# Patient Record
Sex: MALE | Race: WHITE | NOT HISPANIC OR LATINO | Employment: FULL TIME | URBAN - METROPOLITAN AREA
[De-identification: names, ages, dates, MRNs, and addresses within clinical notes are randomized per-mention and may not be internally consistent; named-entity substitution may affect disease eponyms.]

---

## 2022-03-26 ENCOUNTER — OFFICE VISIT (OUTPATIENT)
Dept: URGENT CARE | Facility: CLINIC | Age: 32
End: 2022-03-26
Payer: MEDICAID

## 2022-03-26 VITALS
HEART RATE: 71 BPM | BODY MASS INDEX: 35.68 KG/M2 | TEMPERATURE: 98.1 F | HEIGHT: 70 IN | SYSTOLIC BLOOD PRESSURE: 116 MMHG | DIASTOLIC BLOOD PRESSURE: 76 MMHG | RESPIRATION RATE: 16 BRPM | WEIGHT: 249.2 LBS | OXYGEN SATURATION: 99 %

## 2022-03-26 DIAGNOSIS — R19.7 DIARRHEA, UNSPECIFIED TYPE: ICD-10-CM

## 2022-03-26 PROCEDURE — 99204 OFFICE O/P NEW MOD 45 MIN: CPT | Performed by: PHYSICIAN ASSISTANT

## 2022-03-26 RX ORDER — AZITHROMYCIN 500 MG/1
1000 TABLET, FILM COATED ORAL ONCE
Qty: 2 TABLET | Refills: 0 | Status: SHIPPED | OUTPATIENT
Start: 2022-03-26 | End: 2022-03-26

## 2022-03-26 ASSESSMENT — ENCOUNTER SYMPTOMS
SHORTNESS OF BREATH: 0
ABDOMINAL PAIN: 1
MYALGIAS: 0
COUGH: 0
CHILLS: 0
HEADACHES: 0
SORE THROAT: 0
FEVER: 0
NAUSEA: 0
DIARRHEA: 1
CONSTIPATION: 0
VOMITING: 0
EYE PAIN: 0

## 2022-03-26 NOTE — PROGRESS NOTES
"Subjective:   Fran Vivas is a 32 y.o. male who presents for Diarrhea (Abd pain x 1 week )      Is a 32-year-old male who has been suffering from 1 week of persistent diarrhea with intermittent abdominal cramping.  Patient reports that onset was 7 days ago without any prodrome, recent travel, recent antibiotics.  He has not had any camping or suspected food ingestion.  Patient reports typically greater than 10 loose bowel movements per day, no blood.  Tried Pepto-Bismol with minimal relief.  Has never had this problem before.  No family history of gastrointestinal: Problems.  He has not noticed any fevers or body aches.  He is not experiencing nausea, vomiting, heartburn.  He has tried various different foods none of which seems to exacerbate or alleviate his diarrhea      Review of Systems   Constitutional: Negative for chills and fever.   HENT: Negative for congestion, ear pain and sore throat.    Eyes: Negative for pain.   Respiratory: Negative for cough and shortness of breath.    Cardiovascular: Negative for chest pain.   Gastrointestinal: Positive for abdominal pain and diarrhea. Negative for constipation, nausea and vomiting.   Genitourinary: Negative for dysuria.   Musculoskeletal: Negative for myalgias.   Skin: Negative for rash.   Neurological: Negative for headaches.       Medications, Allergies, and current problem list reviewed today in Epic.     Objective:     /76   Pulse 71   Temp 36.7 °C (98.1 °F) (Temporal)   Resp 16   Ht 1.778 m (5' 10\")   Wt 113 kg (249 lb 3.2 oz)   SpO2 99%     Physical Exam  Vitals reviewed.   Constitutional:       Appearance: Normal appearance. He is not ill-appearing or toxic-appearing.   HENT:      Head: Normocephalic and atraumatic.      Right Ear: External ear normal.      Left Ear: External ear normal.      Nose: Nose normal.      Mouth/Throat:      Mouth: Mucous membranes are moist.   Eyes:      Conjunctiva/sclera: Conjunctivae normal.   Cardiovascular:      " Rate and Rhythm: Normal rate.   Pulmonary:      Effort: Pulmonary effort is normal.   Abdominal:      Tenderness: There is no abdominal tenderness. There is no guarding or rebound.   Skin:     General: Skin is warm and dry.      Capillary Refill: Capillary refill takes less than 2 seconds.   Neurological:      Mental Status: He is alert and oriented to person, place, and time.         Assessment/Plan:     Diagnosis and associated orders:     1. Diarrhea, unspecified type  azithromycin (ZITHROMAX) 500 MG tablet    Cdiff By PCR Rflx Toxin    Ova & Parasite Exam, Fecal    CULTURE STOOL      Comments/MDM:     • Presumed infectious diarrhea based on the duration of symptoms.  He may demonstrate significant improvement.  Patient was somewhat reticent to wait for stool studies and as a result I discussed with them risk versus benefits of empiric treatment with azithromycin.  He was amenable to trial this after collecting the stool sample and dropping off to the lab.  No bloody diarrhea, fevers.  No recent antibiotics which makes C. difficile and Shiga toxin less likely.  Discussed appropriate hydration, could consider use of Imodium if disruptive to life from greater than 15 stools a day ER precautions discussed.  Thankfully patient with a soft nontender abdomen, low suspicion for an acute intra-abdominal pathology         Differential diagnosis, natural history, supportive care, and indications for immediate follow-up discussed.    Advised the patient to follow-up with the primary care physician for recheck, reevaluation, and consideration of further management.    Please note that this dictation was created using voice recognition software. I have made a reasonable attempt to correct obvious errors, but I expect that there are errors of grammar and possibly content that I did not discover before finalizing the note.    This note was electronically signed by Hakan Kruse PA-C

## 2022-11-26 ENCOUNTER — OFFICE VISIT (OUTPATIENT)
Dept: URGENT CARE | Facility: CLINIC | Age: 32
End: 2022-11-26
Payer: MEDICAID

## 2022-11-26 VITALS
HEART RATE: 116 BPM | RESPIRATION RATE: 18 BRPM | WEIGHT: 247.5 LBS | OXYGEN SATURATION: 98 % | TEMPERATURE: 97.6 F | HEIGHT: 70 IN | SYSTOLIC BLOOD PRESSURE: 140 MMHG | DIASTOLIC BLOOD PRESSURE: 82 MMHG | BODY MASS INDEX: 35.43 KG/M2

## 2022-11-26 DIAGNOSIS — L50.9 URTICARIA: ICD-10-CM

## 2022-11-26 PROCEDURE — 99213 OFFICE O/P EST LOW 20 MIN: CPT | Performed by: NURSE PRACTITIONER

## 2022-11-26 RX ORDER — HYDROXYZINE HYDROCHLORIDE 25 MG/1
25 TABLET, FILM COATED ORAL EVERY 8 HOURS PRN
Qty: 30 TABLET | Refills: 0 | Status: SHIPPED | OUTPATIENT
Start: 2022-11-26 | End: 2022-12-06

## 2022-11-26 RX ORDER — PREDNISONE 20 MG/1
TABLET ORAL
COMMUNITY
Start: 2022-11-25

## 2022-11-27 NOTE — PROGRESS NOTES
Patient has consented to treatment and for use of patient information for treatment and billing purposes.    Date: 11/26/22     Arrival Mode: Private Vehicle    Chief Complaint:    Chief Complaint   Patient presents with    Rash     X 2 days body rash/ itching/burning sensation/pain        History of Present Illness: 32 y.o.  male presents to clinic with a rash for the past 2 days.  Patient was recently traveling to [place] and began having a rash that is pruritic in nature.  Patient states it feels like he is sunburned at times.  The rash does come and go and does appear in different areas than the previous area.  Patient did seek care at an urgent care in [place] yesterday where they did give him a steroid injection which he states completely cleared of the rash.  They did give him a course of prednisone which she did take today.  Patient states the rash is improving although it is still reappearing at this time.  Patient did try 1 dose of Benadryl yesterday states it was not significantly helpful.  Denies any shortness of breath eye irritation or mucous membrane cracking or pain.  No new medications other than the prednisone.  No excessive ibuprofen use no recent antibiotics.  No new personal hygiene products other than a toothpaste.  No mucous membrane swelling shortness of breath or chest pain.  No vomiting or diarrhea.      ROS:    As stated in HPI     Pertinent Medical History:  No past medical history on file.     Pertinent Surgical History:  No past surgical history on file.     Pertinent Medications:    Current Outpatient Medications on File Prior to Visit   Medication Sig Dispense Refill    predniSONE (DELTASONE) 20 MG Tab        No current facility-administered medications on file prior to visit.        Allergies:    Patient has no known allergies.     Social History:  Social History     Tobacco Use    Smoking status: Every Day     Types: Cigarettes    Smokeless tobacco: Never   Vaping Use     Vaping Use: Never used   Substance Use Topics    Alcohol use: Yes     Comment: [facility]    Drug use: Not Currently        No LMP for male patient.           Physical Exam:    Vitals:    11/26/22 1417   BP: (!) 140/82   Pulse: (!) 116   Resp: 18   Temp: 36.4 °C (97.6 °F)   SpO2: 98%             Physical Exam  Constitutional:       General: He is awake.      Appearance: Normal appearance. He is not ill-appearing, toxic-appearing or diaphoretic.   HENT:      Head: Normocephalic and atraumatic.      Right Ear: Tympanic membrane, ear canal and external ear normal.      Left Ear: Tympanic membrane, ear canal and external ear normal.   Eyes:      General: Lids are normal. Gaze aligned appropriately. No allergic shiner or scleral icterus.     Extraocular Movements: Extraocular movements intact.      Conjunctiva/sclera: Conjunctivae normal.      Pupils: Pupils are equal, round, and reactive to light.   Cardiovascular:      Rate and Rhythm: Normal rate and regular rhythm.      Pulses:           Radial pulses are 2+ on the right side and 2+ on the left side.      Heart sounds: Normal heart sounds.   Pulmonary:      Effort: Pulmonary effort is normal.      Breath sounds: Normal breath sounds and air entry. No decreased breath sounds, wheezing, rhonchi or rales.   Musculoskeletal:      Right lower leg: No edema.      Left lower leg: No edema.   Lymphadenopathy:      Cervical: No cervical adenopathy.   Skin:     General: Skin is warm.      Capillary Refill: Capillary refill takes less than 2 seconds.      Coloration: Skin is not cyanotic or pale.      Findings: Rash present. Rash is urticarial.      Comments: Urticarial rash coalescing into large plaques.  No signs of bacterial infection.  Consistent with allergic rash.   Neurological:      Mental Status: He is alert and oriented to person, place, and time.      Gait: Gait is intact.   Psychiatric:         Behavior: Behavior normal. Behavior is cooperative.             Diagnostics:    None   Medical Decision making and clinic course :  I personally reviewed prior external notes and test results pertinent to today's visit.   Shared decision-making was utilized with patient for treatment plan.  Did discuss with patient as well as his wife that exam findings are consistent with an allergic rash.  Encouraged to continue prednisone to discuss elevated blood pressure as well as mild tachycardia noted on exam are likely due to the prednisone.  Fortunately patient's lung sounds are clear no eye or mucous membrane involvement low suspicion for Dipesh Brandon syndrome.  As well as rashes not consistent with SJS or tens.  Encourage patient to continue prednisone course.  Will send for Vistaril for itching.  Discussed not take this with ibuprofen.  Did recommend daily Claritin or Zyrtec.  Encouraged a rash journal.  Will refer to dermatology for follow-up.  If symptoms change or worsen seek high-level care.      The patient remained stable during the urgent care visit.    Plan:    Medication discussed included indication for use and the potential benefits and side effects.    1. Urticaria    - hydrOXYzine HCl (ATARAX) 25 MG Tab; Take 1 Tablet by mouth every 8 hours as needed for Itching (allergies) for up to 10 days.  Dispense: 30 Tablet; Refill: 0  - Referral to Dermatology        All of the patient's questions were answered to their satisfaction at the time of discharge.    Follow up:    Patient was encouraged to monitor symptoms closely. Those signs and symptoms which would warrant concern and mandate seeking a higher level of service through the emergency department discussed at length and included in discharge papers.  Patient stated agreement and understanding of this plan of care.    Disposition:  Home in stable condition       Voice Recognition Disclaimer:  Portions of this document were created using voice recognition software. The software does have a chance of producing  errors of grammar and possibly content. I have made every reasonable attempt to correct obvious errors, but there may be errors of grammar and possibly content that I did not discover before finalizing the documentation.    ROSEMARY Lee.

## 2024-09-20 ENCOUNTER — OFFICE VISIT (OUTPATIENT)
Dept: URGENT CARE | Facility: CLINIC | Age: 34
End: 2024-09-20
Payer: MEDICAID

## 2024-09-20 VITALS
TEMPERATURE: 97.8 F | OXYGEN SATURATION: 98 % | HEIGHT: 70 IN | DIASTOLIC BLOOD PRESSURE: 74 MMHG | HEART RATE: 78 BPM | RESPIRATION RATE: 20 BRPM | BODY MASS INDEX: 36.63 KG/M2 | WEIGHT: 255.9 LBS | SYSTOLIC BLOOD PRESSURE: 118 MMHG

## 2024-09-20 DIAGNOSIS — Z02.89 ENCOUNTER TO OBTAIN EXCUSE FROM WORK: ICD-10-CM

## 2024-09-20 DIAGNOSIS — R19.7 NAUSEA VOMITING AND DIARRHEA: ICD-10-CM

## 2024-09-20 DIAGNOSIS — R11.2 NAUSEA VOMITING AND DIARRHEA: ICD-10-CM

## 2024-09-20 PROCEDURE — 3074F SYST BP LT 130 MM HG: CPT | Performed by: STUDENT IN AN ORGANIZED HEALTH CARE EDUCATION/TRAINING PROGRAM

## 2024-09-20 PROCEDURE — 3078F DIAST BP <80 MM HG: CPT | Performed by: STUDENT IN AN ORGANIZED HEALTH CARE EDUCATION/TRAINING PROGRAM

## 2024-09-20 PROCEDURE — 99213 OFFICE O/P EST LOW 20 MIN: CPT | Performed by: STUDENT IN AN ORGANIZED HEALTH CARE EDUCATION/TRAINING PROGRAM

## 2024-09-20 ASSESSMENT — ENCOUNTER SYMPTOMS
BLOOD IN STOOL: 0
CHILLS: 0
NAUSEA: 1
PALPITATIONS: 0
MYALGIAS: 1
VOMITING: 1
FEVER: 0
CONSTIPATION: 0
SHORTNESS OF BREATH: 0
WHEEZING: 0
DIARRHEA: 1
ABDOMINAL PAIN: 1
COUGH: 0

## 2024-09-20 NOTE — LETTER
September 20, 2024    To Whom It May Concern:         This is confirmation that Fran Vivas attended his scheduled appointment with Inés Berg P.A.-C. on 9/20/24. Please excuse work absences 9/18/24 through 9/20/24 for medical reasons. Fran can return to work without restrictions on 9/23/24 or earlier as long as symptoms have improved/resolved and there has been no fever for 24 hours.        Sincerely,    Inés Berg P.A.-C.  [phone]

## 2024-09-20 NOTE — PROGRESS NOTES
"Subjective     Fran Vivas is a 34 y.o. male who presents with Emesis (X2days abdominal discomfort/diarrhea/patient needs DrDanielle note )            Fran is a 34 y.o. male who presents to urgent care with nausea, vomiting, diarrhea.  Patient states that his children were sick with some type of stomach flu and they gave it to him. Patient missed work due to symptoms.  Overall symptoms are improving.  Nausea/vomiting has resolved.  Patient states bowel movement was more formed this morning. patient had a fever initially which is since resolved.  He reports some bodyaches.  Patient tolerating p.o. food/fluids. He needs a work note for the days he missed due to symptoms.        Review of Systems   Constitutional:  Negative for chills, fever and malaise/fatigue.   Respiratory:  Negative for cough, shortness of breath and wheezing.    Cardiovascular:  Negative for chest pain and palpitations.   Gastrointestinal:  Positive for abdominal pain (improving), diarrhea (improving), nausea (resolved) and vomiting (resolved). Negative for blood in stool, constipation and melena.   Musculoskeletal:  Positive for myalgias.   All other systems reviewed and are negative.             Objective     /74   Pulse 78   Temp 36.6 °C (97.8 °F) (Temporal)   Resp 20   Ht 1.778 m (5' 10\")   Wt 116 kg (255 lb 14.4 oz)   SpO2 98%   BMI 36.72 kg/m²      Physical Exam  Vitals reviewed.   Constitutional:       Appearance: Normal appearance.   HENT:      Head: Normocephalic and atraumatic.      Nose: Nose normal.   Eyes:      Extraocular Movements: Extraocular movements intact.      Conjunctiva/sclera: Conjunctivae normal.      Pupils: Pupils are equal, round, and reactive to light.   Cardiovascular:      Rate and Rhythm: Normal rate.   Pulmonary:      Effort: Pulmonary effort is normal.   Abdominal:      General: Abdomen is flat. Bowel sounds are normal. There is no distension.      Palpations: Abdomen is soft.      Tenderness: There is no " abdominal tenderness. There is no guarding or rebound.   Skin:     General: Skin is warm.   Neurological:      General: No focal deficit present.      Mental Status: He is alert and oriented to person, place, and time.                             Assessment & Plan        Assessment & Plan  Nausea vomiting and diarrhea  - Nausea and vomiting has resolved.  - Diarrhea is improving and reports more formed BM today.         Encounter to obtain excuse from work  - Work note provided per patient request.              Differential diagnoses, supportive care measures (rest, importance of oral hydration, bland/brat diet, OTC Pepto-Bismol) and indications for immediate follow-up discussed with patient. Pathogenesis of diagnosis discussed including typical length and natural progression.      Instructed to return to urgent care or nearest emergency department if symptoms fail to improve, for any change in condition, further concerns, or new concerning symptoms.    Patient states understanding and agrees with the plan of care and discharge instructions.

## 2025-01-19 ENCOUNTER — HOSPITAL ENCOUNTER (OUTPATIENT)
Facility: MEDICAL CENTER | Age: 35
End: 2025-01-19
Attending: NURSE PRACTITIONER
Payer: COMMERCIAL

## 2025-01-19 ENCOUNTER — OFFICE VISIT (OUTPATIENT)
Dept: URGENT CARE | Facility: CLINIC | Age: 35
End: 2025-01-19
Payer: COMMERCIAL

## 2025-01-19 VITALS
DIASTOLIC BLOOD PRESSURE: 88 MMHG | OXYGEN SATURATION: 97 % | SYSTOLIC BLOOD PRESSURE: 124 MMHG | RESPIRATION RATE: 18 BRPM | TEMPERATURE: 98.2 F | HEIGHT: 70 IN | BODY MASS INDEX: 35.79 KG/M2 | WEIGHT: 250 LBS | HEART RATE: 84 BPM

## 2025-01-19 DIAGNOSIS — R30.0 DYSURIA: ICD-10-CM

## 2025-01-19 DIAGNOSIS — R39.9 UTI SYMPTOMS: ICD-10-CM

## 2025-01-19 DIAGNOSIS — R31.9 HEMATURIA, UNSPECIFIED TYPE: ICD-10-CM

## 2025-01-19 LAB
APPEARANCE UR: NORMAL
BILIRUB UR STRIP-MCNC: NORMAL MG/DL
COLOR UR AUTO: NORMAL
GLUCOSE UR STRIP.AUTO-MCNC: NORMAL MG/DL
KETONES UR STRIP.AUTO-MCNC: NORMAL MG/DL
LEUKOCYTE ESTERASE UR QL STRIP.AUTO: NORMAL
NITRITE UR QL STRIP.AUTO: NORMAL
PH UR STRIP.AUTO: 5.5 [PH] (ref 5–8)
PROT UR QL STRIP: NORMAL MG/DL
RBC UR QL AUTO: NORMAL
SP GR UR STRIP.AUTO: 1.03
UROBILINOGEN UR STRIP-MCNC: 0.2 MG/DL

## 2025-01-19 PROCEDURE — 3079F DIAST BP 80-89 MM HG: CPT | Performed by: NURSE PRACTITIONER

## 2025-01-19 PROCEDURE — 81002 URINALYSIS NONAUTO W/O SCOPE: CPT | Performed by: NURSE PRACTITIONER

## 2025-01-19 PROCEDURE — 99213 OFFICE O/P EST LOW 20 MIN: CPT | Performed by: NURSE PRACTITIONER

## 2025-01-19 PROCEDURE — 3074F SYST BP LT 130 MM HG: CPT | Performed by: NURSE PRACTITIONER

## 2025-01-19 PROCEDURE — 87077 CULTURE AEROBIC IDENTIFY: CPT

## 2025-01-19 PROCEDURE — 87086 URINE CULTURE/COLONY COUNT: CPT

## 2025-01-19 RX ORDER — CEFDINIR 300 MG/1
300 CAPSULE ORAL 2 TIMES DAILY
Qty: 14 CAPSULE | Refills: 0 | Status: SHIPPED | OUTPATIENT
Start: 2025-01-19 | End: 2025-01-26

## 2025-01-19 NOTE — PROGRESS NOTES
Chief Complaint   Patient presents with    Dysuria     Started this morning, painful with urination, and blood in the urine           History of Present Illness  The patient is a 34-year-old male who presents for evaluation of hematuria.    He reports an absence of prior symptoms until this morning when he experienced a sudden, sharp pain in the groin area upon awakening and going to the bathroom. This was followed by dysuria and hematuria. He has not urinated since the initial episode. Mild, transient burning sensation post-urination was noted, but no subsequent urge to urinate. . He reports no recent sexual activity or new partners, thus eliminating the possibility of sexually transmitted infections. 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  He describes the pain as dull and consistent no sharp stabbing or colic like pain.  However, this pain has not recurred in recent days. He reports no current pain or pressure in the back. He also reports no penile itching, irritation, or changes in discharge. He admits to inadequate hydration over the past few days.         ROS:      As otherwise stated in HPI    Medical/SX/ Social History:  Reviewed per chart    Pertinent Medications:    Current Outpatient Medications on File Prior to Visit   Medication Sig Dispense Refill    predniSONE (DELTASONE) 20 MG Tab  (Patient not taking: Reported on 1/19/2025)       No current facility-administered medications on file prior to visit.        Allergies:    Minocycline and Sulfamethoxazole-trimethoprim     Problem list, medications, and allergies reviewed by myself today in Epic     Physical Exam:    Vitals:    01/19/25 0915   BP: 124/88   Pulse: 84   Resp: 18   Temp: 36.8 °C (98.2 °F)   SpO2: 97%             Physical Exam  Constitutional:       General: He is not in acute distress.     Appearance: Normal appearance. He is not ill-appearing,  toxic-appearing or diaphoretic.   HENT:      Head: Normocephalic and atraumatic.   Cardiovascular:      Rate and Rhythm: Normal rate and regular rhythm.      Heart sounds: Normal heart sounds.   Pulmonary:      Effort: Pulmonary effort is normal.   Abdominal:      General: Abdomen is flat. Bowel sounds are normal.      Palpations: Abdomen is soft.      Tenderness: There is no abdominal tenderness. There is no right CVA tenderness, left CVA tenderness or guarding.   Skin:     General: Skin is warm.      Capillary Refill: Capillary refill takes less than 2 seconds.      Coloration: Skin is not cyanotic or pale.   Neurological:      Mental Status: He is alert and oriented to person, place, and time.      Gait: Gait is intact.   Psychiatric:         Behavior: Behavior normal. Behavior is cooperative.            Diagnostics:    Results for orders placed or performed in visit on 01/19/25   POCT Urinalysis    Collection Time: 01/19/25 10:11 AM   Result Value Ref Range    POC Color Dark Yellow Negative    POC Appearance Turbid Negative    POC Glucose Neg Negative mg/dL    POC Bilirubin Neg Negative mg/dL    POC Ketones Neg Negative mg/dL    POC Specific Gravity 1.030 <1.005 - >1.030    POC Blood Mod Negative    POC Urine PH 5.5 5.0 - 8.0    POC Protein Trace Negative mg/dL    POC Urobiligen 0.2 Negative (0.2) mg/dL    POC Nitrites Neg Negative    POC Leukocyte Esterase Trace Negative     Urine culture pending    Medical Decision making and plan :  I personally reviewed prior external notes and test results pertinent to today's visit. Pt is clinically stable at today's acute urgent care visit.  Patient appears nontoxic with no acute distress noted. Appropriate for outpatient care at this time.    Pleasant 34 y.o. male presented clinic with:     Assessment & Plan  1. Hematuria.  The presence of blood in the urine suggests a potential urinary tract infection (UTI). Although UTIs are less common in males, inadequate hydration  can facilitate bacterial ascension via the urethra, leading to infection. The sudden onset of symptoms makes a malignant cause unlikely, but a small kidney stone can not be ruled out.  Although due to presenting signs and symptoms does make this less likely.  A urine culture will be conducted to confirm the presence of bacteria, with results expected within 3 to 4 days. Cefdinir 300 mg has been prescribed, to be taken twice daily for 7 days. He is advised to maintain adequate hydration. In the event of severe pain, particularly in the flank region, he should seek immediate medical attention at the ER. If he experiences difficulty urinating for a period exceeding 6 hours, he should also proceed to the ER. If the urine culture indicates that cefdinir is not the optimal antibiotic, an alternative will be considered.      Shared decision-making was utilized with patient for treatment plan. Medication discussed included indication for use and the potential benefits and side effects. Education was provided regarding the aforementioned assessments.  Differential Diagnosis, natural history, and supportive care discussed. All of the patient's questions were answered to their satisfaction at the time of discharge. Patient was encouraged to monitor symptoms closely. Those signs and symptoms which would warrant concern and mandate seeking a higher level of service through the emergency department discussed at length.  Patient stated agreement and understanding of this plan of care.    Disposition:  Home in stable condition     Voice Recognition Disclaimer:  Portions of this document were created using voice recognition software and [employer] technology provided by [employer]. The software does have a chance of producing errors of grammar and possibly content. I have made every reasonable attempt to correct obvious errors, but there may be errors of grammar and possibly content that I did not discover before finalizing  the  documentation.    ROSEMARY Lee.

## 2025-01-20 LAB
BACTERIA UR CULT: ABNORMAL
BACTERIA UR CULT: ABNORMAL
SIGNIFICANT IND 70042: ABNORMAL
SITE SITE: ABNORMAL
SOURCE SOURCE: ABNORMAL

## 2025-06-09 ENCOUNTER — OFFICE VISIT (OUTPATIENT)
Dept: URGENT CARE | Facility: PHYSICIAN GROUP | Age: 35
End: 2025-06-09
Payer: COMMERCIAL

## 2025-06-09 VITALS
TEMPERATURE: 98 F | HEART RATE: 77 BPM | OXYGEN SATURATION: 100 % | RESPIRATION RATE: 18 BRPM | WEIGHT: 259.3 LBS | HEIGHT: 70 IN | SYSTOLIC BLOOD PRESSURE: 132 MMHG | BODY MASS INDEX: 37.12 KG/M2 | DIASTOLIC BLOOD PRESSURE: 77 MMHG

## 2025-06-09 DIAGNOSIS — R51.9 ACUTE INTRACTABLE HEADACHE, UNSPECIFIED HEADACHE TYPE: ICD-10-CM

## 2025-06-09 DIAGNOSIS — H81.10 BENIGN PAROXYSMAL POSITIONAL VERTIGO, UNSPECIFIED LATERALITY: ICD-10-CM

## 2025-06-09 PROCEDURE — 3075F SYST BP GE 130 - 139MM HG: CPT | Performed by: STUDENT IN AN ORGANIZED HEALTH CARE EDUCATION/TRAINING PROGRAM

## 2025-06-09 PROCEDURE — 3078F DIAST BP <80 MM HG: CPT | Performed by: STUDENT IN AN ORGANIZED HEALTH CARE EDUCATION/TRAINING PROGRAM

## 2025-06-09 PROCEDURE — 99214 OFFICE O/P EST MOD 30 MIN: CPT | Performed by: STUDENT IN AN ORGANIZED HEALTH CARE EDUCATION/TRAINING PROGRAM

## 2025-06-09 RX ORDER — ELETRIPTAN HYDROBROMIDE 40 MG/1
TABLET, FILM COATED ORAL
Qty: 10 TABLET | Refills: 0 | Status: SHIPPED | OUTPATIENT
Start: 2025-06-09

## 2025-06-09 RX ORDER — PROCHLORPERAZINE MALEATE 5 MG/1
5 TABLET ORAL EVERY 6 HOURS PRN
Qty: 20 TABLET | Refills: 0 | Status: SHIPPED | OUTPATIENT
Start: 2025-06-09

## 2025-06-09 RX ORDER — NAPROXEN SODIUM 550 MG/1
TABLET ORAL
Qty: 90 TABLET | Refills: 0 | Status: SHIPPED | OUTPATIENT
Start: 2025-06-09

## 2025-06-09 NOTE — PROGRESS NOTES
Subjective:   CHIEF COMPLAINT  Chief Complaint   Patient presents with    Dizziness     Dizziness , Non stop for several days it comes in waves .        HPI    History of Present Illness  Fran Vivas is a 35 y.o. male who presents for evaluation of dizziness, headache, and constipation.    He has been experiencing intermittent episodes of dizziness for the past week, characterized by a sensation of imbalance rather than vertigo or lightheadedness.  States there are no reliable triggers however has noted symptoms are aggravated with head movement.  He reports no recent head trauma or initiation of new medications. He is not on any chronic medications. He recalls a single episode of ear discomfort, akin to a temporary blockage, which precipitated a dizzy spell. He reports no cold-like symptoms, palpitations, or racing heart. He reports no sore throat, cough, or cold-like symptoms. He reports no numbness, tingling, or weakness, but does report overall fatigue and tiredness. His sleep has been disrupted over the past few nights. He reports no changes in vision, except for occasional floaters during preceding his headaches. He has been managing his symptoms with Tylenol.    He also reports a current headache, which initially presented as intense approximately 9 days ago, then subsided, but has since recurred over the past 2 days. The headache is predominantly located at the back of his head, making it difficult to lie down, and was initially unilateral on the left side but has since become more generalized, occasionally affecting his eyes and causing jaw pain. He has a history of occasional headaches and migraines, which are typically responsive to Tylenol. He reports no family history of migraines.    On ROS patient also reported he was experiencing some left upper quadrant/flank pain that radiated anteriorly for the last couple days.  Symptoms mostly resolved this morning after having a large BM.  Reports he was  "feeling some constipation.  No  vomiting or diarrhea.  Tolerating p.o. intake.    He takes Allegra for allergies as needed.    FAMILY HISTORY  He does not report a family history of migraines that he can recall.        REVIEW OF SYSTEMS  General: no fever or chills  GI: Admits nausea without vomiting  See HPI for further details.    PAST MEDICAL HISTORY  There are no active problems to display for this patient.      SURGICAL HISTORY  patient denies any surgical history    ALLERGIES  Allergies[1]    CURRENT MEDICATIONS  Home Medications       Reviewed by Valentino Mcdonnell D.O. (Physician) on 06/09/25 at 1514  Med List Status: <None>     Medication Last Dose Status   eletriptan (RELPAX) 40 MG tablet  Active   naproxen sodium (ANAPROX) 550 MG tablet  Active   predniSONE (DELTASONE) 20 MG Tab Not Taking Active   prochlorperazine (COMPAZINE) 5 MG Tab  Active                    SOCIAL HISTORY  Social History     Tobacco Use    Smoking status: Former     Types: Cigarettes    Smokeless tobacco: Never   Vaping Use    Vaping status: Never Used   Substance and Sexual Activity    Alcohol use: Yes     Comment: occs    Drug use: Not Currently    Sexual activity: Not on file       FAMILY HISTORY  History reviewed. No pertinent family history.       Objective:   PHYSICAL EXAM  VITAL SIGNS: /77 (BP Location: Left arm, Patient Position: Sitting, BP Cuff Size: Adult)   Pulse 77   Temp 36.7 °C (98 °F) (Temporal)   Resp 18   Ht 1.778 m (5' 10\")   Wt 118 kg (259 lb 4.8 oz)   SpO2 100%   BMI 37.21 kg/m²     Gen: no acute distress, normal voice  Skin: dry, intact, moist mucosal membranes  Eye: EOMI and PERRLA b/l.  No conjunctival injection bilaterally.  No pain with extraocular eye movement.  No photophobia swinging penlight test.  No nystagmus.  Neck: Normal range of motion. No meningeal signs.   ENT: No oropharyngeal erythema or exudates. Uvula midline. TMs clear and intact b/l w/o bulging, erythema or effusion. No " lymphadenopathy.  Lungs: No increased work of breathing.  CTAB w/ symmetric expansion  CV: RRR w/o murmurs or clicks  Psych: normal affect, normal judgement, alert, awake    Assessment/Plan:     1. Benign paroxysmal positional vertigo, unspecified laterality  Referral back to PCP      2. Acute intractable headache, unspecified headache type  prochlorperazine (COMPAZINE) 5 MG Tab    eletriptan (RELPAX) 40 MG tablet    naproxen sodium (ANAPROX) 550 MG tablet    Referral back to PCP      1) history consistent with BPPV.  Exam was nonrevealing and reassuring.  -Provided a handout to perform Epley's maneuver at home    2) migraine versus tension headache  -Ordered naproxen to be used as needed  -Ordered Compazine to be used as needed to help with nausea.  Side effects including drowsiness were discussed  -Ordered a trial of Relpax  -Ordered a referral to establish primary care  - Return to urgent care any new/worsening symptoms or further questions or concerns.  Patient understood everything discussed.  All questions were answered.      31 minutes was spent caring for this patient on the day of the encounter which included face-to-face time, discussing the diagnosis, medical management, follow-up, return precautions and completion of the chart. This does not include time spent on separately billable procedures/tests.    Please note that this dictation was created using voice recognition software. I have made a reasonable attempt to correct obvious errors, but I expect that there are errors of grammar and possibly content that I did not discover before finalizing the note.                [1]   Allergies  Allergen Reactions    Minocycline Rash     Reviewed By:ALEX JONES    Sulfamethoxazole-Trimethoprim Rash     Reviewed By:MARIE-CLAUDE DAVID